# Patient Record
Sex: MALE | Race: WHITE | NOT HISPANIC OR LATINO | Employment: OTHER
[De-identification: names, ages, dates, MRNs, and addresses within clinical notes are randomized per-mention and may not be internally consistent; named-entity substitution may affect disease eponyms.]

---

## 2024-04-15 ENCOUNTER — TRANSCRIBE ORDERS (OUTPATIENT)
Dept: OTHER | Age: 64
End: 2024-04-15

## 2024-04-15 DIAGNOSIS — C34.2 MALIGNANT NEOPLASM OF MIDDLE LOBE OF RIGHT LUNG (H): Primary | ICD-10-CM

## 2024-04-16 ENCOUNTER — PATIENT OUTREACH (OUTPATIENT)
Dept: ONCOLOGY | Facility: CLINIC | Age: 64
End: 2024-04-16
Payer: MEDICAID

## 2024-04-16 ENCOUNTER — PRE VISIT (OUTPATIENT)
Dept: OTHER | Age: 64
End: 2024-04-16

## 2024-04-16 NOTE — PROGRESS NOTES
New Patient Oncology Nurse Navigator Note     Referring provider: Dr. Haja Matthew    Referring Clinic/Organization: [facility]  Referred to: Radiation Oncology [facility] [place] [facility] - [phone]   Requested provider (if applicable): First available - did not specify   Referral Received: 24       Evaluation for :   Diagnosis   C34.2 (ICD-10-CM) - Malignant neoplasm of middle lobe of right lung (H)      Note:  Malignant neoplasm of middie lobe of right lung  Faxed referral received from Haja Matthew MD at [facility]  Phone: [phone]  Fax: not provided    Clinical History (per Nurse review of records provided):      2024 Path Non Gyn Cytology (Care Everywhere) showed:   Final Diagnosis A) RIGHT LUNG, MIDDLE LOBE, NODULE, TRANSBRONCHIAL BIOPSY:  Positive for malignancy; adenocarcinoma compatible with pulmonary origin    B) LYMPH NODE, STATION 11R, INTERLOBAR, EBUS GUIDED FINE-NEEDLE ASPIRATION:  1. Nondiagnostic specimen  2. Insufficient lymphocytes to assure adequate lymph node sampling  3. Specimen comprised of benign bronchial epithelial cells and peripheral blood elements   Electronically signed by Bennett Damon MD for Bruna Nava MD on 3/15/2024 at  9:21 AM     Clinical Information 66-year-old with an enlarging right middle lobe nodule, currently measuring 2 x 1.7 cm. Moderate emphysema.    Bronchoscopy findings: Right middle lobe medial subsegmental nodule, endobronchial, on 100% obstructing the airway. 0.6 cm station 11 R lymph node.       2024 PET CT (Care Everywhere) showed:   IMPRESSION:     Findings suspicious for the right middle lobe pulmonary adenocarcinoma without metastasis.     04/15/2024 Recent OV notes from referring provider (Care Everywhere)    Clinical Assessment / Barriers to Care (Per Nurse):  Tobacco Use Types Packs/Day Years Used Date   Smoking Tobacco: Former Cigarettes 1.5 46.7 1977 - 2023     Previous Radiation Therapy:  No  Records Location: Care Everywhere   Records Needed: Outside images from [facility]  Additional testing needed prior to consult: None     Aleida Matthew, BSN, RN, OCN  [facility] Oncology Nurse Navigator  [phone] / [phone]

## 2024-04-16 NOTE — TELEPHONE ENCOUNTER
MEDICAL RECORDS REQUEST   Radiation Oncology  [address] [place] [address]  Fax: [phone]          FUTURE VISIT INFORMATION                                                   Winston Paiz, : 1960 scheduled for future visit at [facility] Radiation Oncology    RECORDS REQUESTED FOR VISIT                                                     Action 2024 1:59 PM    Action Taken - Req is faxed to [facility] for images.     2024  1:54 PM  TJ  Images received and resolved to PACS.     Please request outside CT, PET images from [facility] and [facility] from the last 5 years.    LUNG     OFFICE NOTE from medical oncologist [facility] 4/15/24: Dr. Haja Matthew   OFFICE NOTE from pulmonologist [facility] 10/24/22: Dr. Barber Gerard   OPERATIVE/BIOPSY REPORTS [facility] 3/13/24: RONCHOSCOPY    MEDICATION LIST [facility]    LABS     PATHOLOGY REPORTS Report in [facility] 3/13/24: [id number] (positive)   ANYTHING RELATED TO DIAGNOSIS [facility] Most recent 23   IMAGING (NEED IMAGES & REPORT)     CT SCANS (Img req from [facility]) [facility]:  3/13/24: CT Chest    [facility]:  22: CT Chest  23: CT Chest Lung   XRAYS (Img req from [facility]) [facility]:  22, 22: XR Chest    [facility]:  3/13/24: XR Chest  3/13/24: XR Bronchoscopy   PET (Img req from [facility]) [facility]:  3/30/24: PET Skull to Mid Thigh

## 2024-04-25 ENCOUNTER — OFFICE VISIT (OUTPATIENT)
Dept: RADIATION THERAPY | Facility: OUTPATIENT CENTER | Age: 64
End: 2024-04-25
Attending: THORACIC SURGERY (CARDIOTHORACIC VASCULAR SURGERY)
Payer: MEDICAID

## 2024-04-25 VITALS
OXYGEN SATURATION: 91 % | HEART RATE: 80 BPM | SYSTOLIC BLOOD PRESSURE: 120 MMHG | WEIGHT: 149.6 LBS | RESPIRATION RATE: 28 BRPM | DIASTOLIC BLOOD PRESSURE: 77 MMHG

## 2024-04-25 DIAGNOSIS — C34.2 NON-SMALL CELL CANCER OF MIDDLE LOBE OF RIGHT LUNG (H): Primary | ICD-10-CM

## 2024-04-25 RX ORDER — PREDNISONE 5 MG/1
5 TABLET ORAL DAILY
COMMUNITY

## 2024-04-25 RX ORDER — ALBUTEROL SULFATE 90 UG/1
1-2 AEROSOL, METERED RESPIRATORY (INHALATION) EVERY 4 HOURS PRN
COMMUNITY
Start: 2024-03-11

## 2024-04-25 RX ORDER — FLUTICASONE PROPIONATE AND SALMETEROL XINAFOATE 115; 21 UG/1; UG/1
2 AEROSOL, METERED RESPIRATORY (INHALATION) 2 TIMES DAILY
COMMUNITY
Start: 2023-11-13

## 2024-04-25 RX ORDER — MONTELUKAST SODIUM 10 MG/1
10 TABLET ORAL AT BEDTIME
COMMUNITY
Start: 2024-01-12

## 2024-04-25 RX ORDER — IPRATROPIUM BROMIDE AND ALBUTEROL SULFATE 2.5; .5 MG/3ML; MG/3ML
3 SOLUTION RESPIRATORY (INHALATION) EVERY 4 HOURS PRN
COMMUNITY
Start: 2023-06-05

## 2024-04-25 NOTE — NURSING NOTE
REASON FOR APPOINTMENT   Newly diagnosed right lung cancer, s/p biopsy  Not a surgical candidate, here to discuss radiation therapy    PERSONAL HISTORY OF CANCER   Previous Cancer ? no   Prior Radiation ? no   Prior Chemotherapy ? no   Prior Hormonal Therapy ? no     REFERRALS NEEDED  Not at this time    VITALS  /77 (BP Location: Left arm, Patient Position: Sitting, Cuff Size: Adult Regular)   Pulse 80   Resp 28   Wt 67.9 kg (149 lb 9.6 oz)   SpO2 91%     PACEMAKER/IMPLANTED CARDIAC DEVICE : No    PAIN  Denies    PSYCHOSOCIAL  Marital Status: single, here today with his JORDY Cain - who lives in [place]  Patient lives in [place], [facility]  Number of children: 0  Working status: retired   Do you feel safe in your home? Yes    REVIEW OF SYSTEMS  Skin: negative  Eyes: negative  Ears/Nose/Throat: negative  Respiratory: sob w/exertion, occasional smokers cough, wears oxygen 1/2 to 1 liter prn   Cardiovascular: negative  Gastrointestinal: negative  Genitourinary: negative  Musculoskeletal: negative  Neurologic: negative  Psychiatric: negative  Hematologic/Lymphatic/Immunologic: negative  Endocrine: negative    Radiation Oncology Patient Teaching    Current Concern: none at this time    Person involved with teaching: Patient and JORDY Cain  Patient asked Questions: Yes  Patient was cooperative: Yes  Patient was receptive (willing to accept information given): Yes    Education Assessment  Comprehension ability: High  Knowledge level: Medium  Factors affecting teaching: None    Response To Teaching  Verbalizes understanding    Do you have an advanced directive or living will? No  Are you DNR/DNI? No

## 2024-04-25 NOTE — PROGRESS NOTES
Department of Radiation Oncology  Radiation Therapy Center  [facility]  [address], [address]  [place] [address]  [phone]       Consultation Note    Name: Winston Paiz MRN: [id number]   : 1960   Date of Service: 2024  Referring: Dr. Matthew     Reason for consultation: Non-small cell lung cancer of the right middle lobe, clinical T1b N0 M0.  Evaluate potential role for radiation therapy.    History of Present Illness   Mr. Paiz is a 63 year old male with diagnosis of non-small cell lung cancer of the right lower lobe.    The patient underwent a low-dose screening CT scan in 2023 which demonstrated a 1.5 cm right middle lobe lung mass.  Subsequent CT scan of the chest on 3/13/2024 demonstrated a 2 cm right lower lobe mass, which is increased in size prior to prior scan.  EBUS was obtained on 3/13/2024.  Right middle lobe biopsy demonstrated non-small cell lung cancer, adenocarcinoma.  Evaluation of station 11 R was nondiagnostic.  PET scan obtained on 3/30/2024 demonstrated the known right middle lobe lung mass measuring 1.9 cm in size, max SUV 60.6.  No evidence of regional or distant disease was noted.  The patient was seen by Dr. Matthew of thoracic surgery team.  He discussed consideration of right middle lobe lobectomy versus SBRT.  The patient does have history of severe COPD with chronic hypoxic respiratory failure on 1L 02  nasal cannula at home and on chronic prednisone.  Given the patient's overall respiratory status, the patient was deemed not the ideal surgical candidate.  He was subsequently referred to our clinic to discuss potential role of radiation therapy as a part of treatment strategy.    Today the patient states he is overall doing okay.  Remains on 1L 02 via NC.  No prior radiation.  No pacemaker.    Past Medical History:   No past medical history on file.    Past Surgical History:   No past surgical history on  file.    Chemotherapy History:  None    Radiation History:  None    Pregnant: Not Applicable  Implanted Cardiac Devices: No    Medications:  No current outpatient medications on file.     No current facility-administered medications for this visit.         Allergies:   Not on File      Family History:  No family history on file.    Review of Systems   A 10-point review of systems was performed. Pertinent findings are noted in the HPI.    Physical Exam   ECOG Status: 1    Vitals:  /77 (BP Location: Left arm, Patient Position: Sitting, Cuff Size: Adult Regular)   Pulse 80   Resp 28   Wt 67.9 kg (149 lb 9.6 oz)   SpO2 91%     Gen: Alert, in NAD  Head: NC/AT  Eyes: PERRL, EOMI, sclera anicteric  Ears: No external auricular lesions  Nose/sinus: No rhinorrhea or epistaxis  Oral cavity/oropharynx: MMM, no visible oral cavity lesions, FOM and BOT are soft to palpation  Neck: Full ROM, supple, no palpable adenopathy  Pulm: No wheezing, stridor or respiratory distress  CV: Extremities are warm and well-perfused, no cyanosis, no pedal edema  Abdominal: Normal bowel sounds, soft, nontender, no masses  Musculoskeletal: Normal bulk and tone  Skin: Normal color and turgor  Neuro: A/Ox3, CN II-XII intact, normal gait    Imaging/Path/Labs   Imaging:   3/30/24  PET        FINDINGS: FDG avid lobulated nodule in the right middle lobe measuring 1.9 x 1.3 x 1.4 cm (SUV max 60.6), not significantly changed in size from 03/13/2024 when measured in a similar fashion, suspicious for the biopsy proven right middle lobe adenocarcinoma without evidence of metastasis.     Moderate emphysema. Calcified right lower lobe pulmonary granuloma. Enlargement of the ascending aorta. Enlargement of the main pulmonary artery which can be seen with pulmonary hypertension. Mild coronary arterial calcification. Multilevel degenerative changes in the spine with grade-1 anterolisthesis L5 on S1.     IMPRESSION:     Findings suspicious for the right  middle lobe pulmonary adenocarcinoma without metastasis.     Path:   3/13/24  A) RIGHT LUNG, MIDDLE LOBE, NODULE, TRANSBRONCHIAL BIOPSY:   Positive for malignancy; adenocarcinoma compatible with pulmonary origin     B) LYMPH NODE, STATION 11R, INTERLOBAR, EBUS GUIDED FINE-NEEDLE ASPIRATION:   1. Nondiagnostic specimen   2. Insufficient lymphocytes to assure adequate lymph node sampling   3. Specimen comprised of benign bronchial epithelial cells and peripheral blood elements       PFT  4/15/24    FEV 56%  DCLO 72%      Assessment    Mr. Paiz is a 63 year old male with a diagnosis of non-small cell lung cancer of the right middle lobe, clinical T1b N0 M0.  Evaluate potential role for radiation therapy.    Plan   I discussed the natural history of what appears to be early stage non-small cell lung cancer of the right middle lobe.    The patient has been evaluated by surgery team who is discussed possibility of surgery versus SBRT.  Patient does have history of severe COPD with chronic hypoxic respiratory failure and on chronic prednisone.  Surgery would require right middle lobectomy and it was felt that surgical resection could potentially worsen the patient's hypoxic respiratory failure.  As such, the patient was not felt to be an ideal candidate for surgery.    I therefore discussed consideration of radiation therapy to treat the right middle lobe non-small cell lung cancer.  I discussed consideration of SBRT in 50 Gray in 5 fractions versus consideration of hypofractionated treatment in 10 fractions depending on dose to organs at risk, namely heart.    I discussed side effects in great detail with the patient.  The patient is interested in proceeding with treatment.  Consent was obtained.  Will schedule the patient for CT simulation with plan to begin radiation therapy soon thereafter.      Marquis Adams MD  Department of Radiation Oncology  [facility]

## 2024-04-25 NOTE — LETTER
2024         RE: Winston Paiz  [address]  [facility] [facility] [address]        Dear Colleague,    Thank you for referring your patient, Winston Paiz, to the [facility] RADIATION THERAPY CLINIC. Please see a copy of my visit note below.       Department of Radiation Oncology  Radiation Therapy Center  [facility]  [address], [address]  [place] [address]  [phone]       Consultation Note    Name: Winston Paiz MRN: [id number]   : 1960   Date of Service: 2024  Referring: Dr. Matthew     Reason for consultation: Non-small cell lung cancer of the right middle lobe, clinical T1b N0 M0.  Evaluate potential role for radiation therapy.    History of Present Illness   Mr. Paiz is a 63 year old male with diagnosis of non-small cell lung cancer of the right lower lobe.    The patient underwent a low-dose screening CT scan in 2023 which demonstrated a 1.5 cm right middle lobe lung mass.  Subsequent CT scan of the chest on 3/13/2024 demonstrated a 2 cm right lower lobe mass, which is increased in size prior to prior scan.  EBUS was obtained on 3/13/2024.  Right middle lobe biopsy demonstrated non-small cell lung cancer, adenocarcinoma.  Evaluation of station 11 R was nondiagnostic.  PET scan obtained on 3/30/2024 demonstrated the known right middle lobe lung mass measuring 1.9 cm in size, max SUV 60.6.  No evidence of regional or distant disease was noted.  The patient was seen by Dr. Matthew of thoracic surgery team.  He discussed consideration of right middle lobe lobectomy versus SBRT.  The patient does have history of severe COPD with chronic hypoxic respiratory failure on 1L 02  nasal cannula at home and on chronic prednisone.  Given the patient's overall respiratory status, the patient was deemed not the ideal surgical candidate.  He was subsequently referred to our clinic to discuss potential role of radiation therapy as a part of  treatment strategy.    Today the patient states he is overall doing okay.  Remains on 1L 02 via NC.  No prior radiation.  No pacemaker.    Past Medical History:   No past medical history on file.    Past Surgical History:   No past surgical history on file.    Chemotherapy History:  None    Radiation History:  None    Pregnant: Not Applicable  Implanted Cardiac Devices: No    Medications:  No current outpatient medications on file.     No current facility-administered medications for this visit.         Allergies:   Not on File      Family History:  No family history on file.    Review of Systems   A 10-point review of systems was performed. Pertinent findings are noted in the HPI.    Physical Exam   ECOG Status: 1    Vitals:  /77 (BP Location: Left arm, Patient Position: Sitting, Cuff Size: Adult Regular)   Pulse 80   Resp 28   Wt 67.9 kg (149 lb 9.6 oz)   SpO2 91%     Gen: Alert, in NAD  Head: NC/AT  Eyes: PERRL, EOMI, sclera anicteric  Ears: No external auricular lesions  Nose/sinus: No rhinorrhea or epistaxis  Oral cavity/oropharynx: MMM, no visible oral cavity lesions, FOM and BOT are soft to palpation  Neck: Full ROM, supple, no palpable adenopathy  Pulm: No wheezing, stridor or respiratory distress  CV: Extremities are warm and well-perfused, no cyanosis, no pedal edema  Abdominal: Normal bowel sounds, soft, nontender, no masses  Musculoskeletal: Normal bulk and tone  Skin: Normal color and turgor  Neuro: A/Ox3, CN II-XII intact, normal gait    Imaging/Path/Labs   Imaging:   3/30/24  PET        FINDINGS: FDG avid lobulated nodule in the right middle lobe measuring 1.9 x 1.3 x 1.4 cm (SUV max 60.6), not significantly changed in size from 03/13/2024 when measured in a similar fashion, suspicious for the biopsy proven right middle lobe adenocarcinoma without evidence of metastasis.     Moderate emphysema. Calcified right lower lobe pulmonary granuloma. Enlargement of the ascending aorta. Enlargement of  the main pulmonary artery which can be seen with pulmonary hypertension. Mild coronary arterial calcification. Multilevel degenerative changes in the spine with grade-1 anterolisthesis L5 on S1.     IMPRESSION:     Findings suspicious for the right middle lobe pulmonary adenocarcinoma without metastasis.     Path:   3/13/24  A) RIGHT LUNG, MIDDLE LOBE, NODULE, TRANSBRONCHIAL BIOPSY:   Positive for malignancy; adenocarcinoma compatible with pulmonary origin     B) LYMPH NODE, STATION 11R, INTERLOBAR, EBUS GUIDED FINE-NEEDLE ASPIRATION:   1. Nondiagnostic specimen   2. Insufficient lymphocytes to assure adequate lymph node sampling   3. Specimen comprised of benign bronchial epithelial cells and peripheral blood elements       PFT  4/15/24    FEV 56%  DCLO 72%      Assessment    Mr. Paiz is a 63 year old male with a diagnosis of non-small cell lung cancer of the right middle lobe, clinical T1b N0 M0.  Evaluate potential role for radiation therapy.    Plan   I discussed the natural history of what appears to be early stage non-small cell lung cancer of the right middle lobe.    The patient has been evaluated by surgery team who is discussed possibility of surgery versus SBRT.  Patient does have history of severe COPD with chronic hypoxic respiratory failure and on chronic prednisone.  Surgery would require right middle lobectomy and it was felt that surgical resection could potentially worsen the patient's hypoxic respiratory failure.  As such, the patient was not felt to be an ideal candidate for surgery.    I therefore discussed consideration of radiation therapy to treat the right middle lobe non-small cell lung cancer.  I discussed consideration of SBRT in 50 Gray in 5 fractions versus consideration of hypofractionated treatment in 10 fractions depending on dose to organs at risk, namely heart.    I discussed side effects in great detail with the patient.  The patient is interested in proceeding with treatment.   Consent was obtained.  Will schedule the patient for CT simulation with plan to begin radiation therapy soon thereafter.      Marquis Adams MD  Department of Radiation Oncology  [facility]         Again, thank you for allowing me to participate in the care of your patient.        Sincerely,        Marquis Adams MD

## 2024-04-30 ENCOUNTER — TELEPHONE (OUTPATIENT)
Dept: RADIATION THERAPY | Facility: OUTPATIENT CENTER | Age: 64
End: 2024-04-30

## 2024-04-30 ENCOUNTER — DOCUMENTATION ONLY (OUTPATIENT)
Dept: RADIATION THERAPY | Facility: OUTPATIENT CENTER | Age: 64
End: 2024-04-30

## 2024-04-30 NOTE — TELEPHONE ENCOUNTER
Patient was seen in consult on 4/25/24 in radiation oncology clinic.  Plan was for patient to return to radiation clinic today for simulation/planning. Insurance team review found that patent's insurance did not cover treatment/care at [facility] radiation.  Financial Counselor explained this to patient over phone.  Rn called referring team - MD Dr. Haja Haas and alerted them we are unable to treat and asked them to refer the patient to a different radiation clinic. They will work on this.

## 2024-05-19 ENCOUNTER — HEALTH MAINTENANCE LETTER (OUTPATIENT)
Age: 64
End: 2024-05-19

## 2025-06-08 ENCOUNTER — HEALTH MAINTENANCE LETTER (OUTPATIENT)
Age: 65
End: 2025-06-08